# Patient Record
(demographics unavailable — no encounter records)

---

## 2024-12-15 NOTE — REVIEW OF SYSTEMS
[Lower Ext Edema] : lower extremity edema [Joint Pain] : joint pain [Insomnia] : insomnia [Negative] : Heme/Lymph

## 2024-12-16 NOTE — HISTORY OF PRESENT ILLNESS
[FreeTextEntry1] : HTN, HLD [de-identified] : Patient comes for 3 month follow up.  He reports feeling well. The Amlodipine was switched to Olmesartan in 9/24. Since then, the pedal edema has resolved.  He saw podiatrist Dr. Timi Gaines in [place]. 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 The injection did not provide relief.

## 2024-12-16 NOTE — PLAN
[FreeTextEntry1] : HTN - BP remains controlled. Continue Olmesartan 20 mg daily. HLD / borderline DM - check fasting labs at next visit. Discussed low carb diet. Referred to foot & ankle ortho Dr. Merritt for Curiel's neuroma.  RTO 3 months fasting.

## 2024-12-16 NOTE — PHYSICAL EXAM
[No Acute Distress] : no acute distress [Well Nourished] : well nourished [Well Developed] : well developed [Well-Appearing] : well-appearing [Thyroid Normal, No Nodules] : the thyroid was normal and there were no nodules present [No Respiratory Distress] : no respiratory distress  [No Accessory Muscle Use] : no accessory muscle use [Clear to Auscultation] : lungs were clear to auscultation bilaterally [Normal Rate] : normal rate  [Regular Rhythm] : with a regular rhythm [Normal S1, S2] : normal S1 and S2 [No Murmur] : no murmur heard [Soft] : abdomen soft [Non Tender] : non-tender [Non-distended] : non-distended [No Masses] : no abdominal mass palpated [Normal Bowel Sounds] : normal bowel sounds [Normal Gait] : normal gait [Normal Affect] : the affect was normal [Normal Mood] : the mood was normal [No Edema] : there was no peripheral edema [de-identified] : friendly

## 2025-03-19 NOTE — HISTORY OF PRESENT ILLNESS
[FreeTextEntry1] : HTN, HLD [de-identified] : Patient comes for 3 month follow up.  He reports feeling well. He went to  on 2/22/25 after developing pruritic urticaria on back. He thinks was related to drinking a specific wine. He was given Benadryl injection and prescribed Medrol dose pack. He feels better now.

## 2025-03-19 NOTE — PHYSICAL EXAM
[No Acute Distress] : no acute distress [Well Nourished] : well nourished [Well Developed] : well developed [Well-Appearing] : well-appearing [Thyroid Normal, No Nodules] : the thyroid was normal and there were no nodules present [No Respiratory Distress] : no respiratory distress  [No Accessory Muscle Use] : no accessory muscle use [Clear to Auscultation] : lungs were clear to auscultation bilaterally [Normal Rate] : normal rate  [Regular Rhythm] : with a regular rhythm [No Murmur] : no murmur heard [Normal S1, S2] : normal S1 and S2 [No Edema] : there was no peripheral edema [Soft] : abdomen soft [Non Tender] : non-tender [Non-distended] : non-distended [No Masses] : no abdominal mass palpated [Normal Bowel Sounds] : normal bowel sounds [Normal Gait] : normal gait [Normal Affect] : the affect was normal [Normal Mood] : the mood was normal [No Rash] : no rash [de-identified] : friendly  [de-identified] : scant right wheeze [de-identified] : no urticaria on back

## 2025-03-19 NOTE — PLAN
[FreeTextEntry1] : HTN - BP remains controlled. Continue Olmesartan 20 mg daily. HLD / borderline DM - check fasting lipids and A1c. Watch diet. Urticaria last month - suspected from drinking wine. Now resolved. Completed Medrol dose pack. Check full lab panel today. CT lung cancer screen due next month, Rx ordered.  RTO 6 months for physical.

## 2025-03-24 NOTE — HISTORY OF PRESENT ILLNESS
[Current] : Current [TextBox_13] : Reviewed and confirmed that the patient meets screening eligibility criteria:   69 years old   Smoking Status: Current Smoker Number of pack years smokin   No symptoms of lung cancer, including new cough, change in cough, hemoptysis, and unintentional weight loss.   No personal history of lung cancer.  No lung cancer in a first degree relative.  No history of lung disease or occupational exposures. [PacksperYear] : 30